# Patient Record
Sex: MALE | URBAN - METROPOLITAN AREA
[De-identification: names, ages, dates, MRNs, and addresses within clinical notes are randomized per-mention and may not be internally consistent; named-entity substitution may affect disease eponyms.]

---

## 2017-10-14 ENCOUNTER — HOSPITAL ENCOUNTER (OUTPATIENT)
Dept: CT IMAGING | Facility: HOSPITAL | Age: 52
Discharge: HOME | End: 2017-10-14
Attending: INTERNAL MEDICINE

## 2017-10-14 VITALS — SYSTOLIC BLOOD PRESSURE: 136 MMHG | DIASTOLIC BLOOD PRESSURE: 86 MMHG | HEART RATE: 51 BPM

## 2017-10-14 DIAGNOSIS — Z13.9 SCREENING FOR CONDITION: ICD-10-CM

## 2017-10-14 NOTE — IMAGING NOTE
Cholesterol/Glucose screening performed: total cholesterol 243, HDL 42, triglycerides 112, , fasting glucose 87. Results discussed with Mr. Kevin Taylor who verbalized understanding. Preliminary results for CT Calcium Score provided:0.00.   Risks and find